# Patient Record
Sex: FEMALE | Race: WHITE | NOT HISPANIC OR LATINO | URBAN - METROPOLITAN AREA
[De-identification: names, ages, dates, MRNs, and addresses within clinical notes are randomized per-mention and may not be internally consistent; named-entity substitution may affect disease eponyms.]

---

## 2022-01-10 ENCOUNTER — EMERGENCY (EMERGENCY)
Facility: HOSPITAL | Age: 87
LOS: 1 days | Discharge: HOME | End: 2022-01-10
Attending: EMERGENCY MEDICINE | Admitting: EMERGENCY MEDICINE
Payer: MEDICARE

## 2022-01-10 VITALS
SYSTOLIC BLOOD PRESSURE: 160 MMHG | DIASTOLIC BLOOD PRESSURE: 79 MMHG | OXYGEN SATURATION: 97 % | RESPIRATION RATE: 18 BRPM | HEART RATE: 74 BPM | TEMPERATURE: 98 F

## 2022-01-10 DIAGNOSIS — M25.512 PAIN IN LEFT SHOULDER: ICD-10-CM

## 2022-01-10 DIAGNOSIS — S62.327A DISPLACED FRACTURE OF SHAFT OF FIFTH METACARPAL BONE, LEFT HAND, INITIAL ENCOUNTER FOR CLOSED FRACTURE: ICD-10-CM

## 2022-01-10 DIAGNOSIS — S00.81XA ABRASION OF OTHER PART OF HEAD, INITIAL ENCOUNTER: ICD-10-CM

## 2022-01-10 DIAGNOSIS — Z23 ENCOUNTER FOR IMMUNIZATION: ICD-10-CM
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
## 2022-01-10 PROCEDURE — 72170 X-RAY EXAM OF PELVIS: CPT | Mod: 26

## 2022-01-10 PROCEDURE — 71045 X-RAY EXAM CHEST 1 VIEW: CPT | Mod: 26

## 2022-01-10 PROCEDURE — 72125 CT NECK SPINE W/O DYE: CPT | Mod: 26,MA

## 2022-01-10 PROCEDURE — 73130 X-RAY EXAM OF HAND: CPT | Mod: 26,LT

## 2022-01-10 PROCEDURE — 73030 X-RAY EXAM OF SHOULDER: CPT | Mod: 26

## 2022-01-10 PROCEDURE — 99284 EMERGENCY DEPT VISIT MOD MDM: CPT

## 2022-01-10 PROCEDURE — 70450 CT HEAD/BRAIN W/O DYE: CPT | Mod: 26,MA

## 2022-01-10 RX ORDER — TETANUS TOXOID, REDUCED DIPHTHERIA TOXOID AND ACELLULAR PERTUSSIS VACCINE, ADSORBED 5; 2.5; 8; 8; 2.5 [IU]/.5ML; [IU]/.5ML; UG/.5ML; UG/.5ML; UG/.5ML
0.5 SUSPENSION INTRAMUSCULAR ONCE
Refills: 0 | Status: COMPLETED | OUTPATIENT
Start: 2022-01-10 | End: 2022-01-10

## 2022-01-10 RX ADMIN — TETANUS TOXOID, REDUCED DIPHTHERIA TOXOID AND ACELLULAR PERTUSSIS VACCINE, ADSORBED 0.5 MILLILITER(S): 5; 2.5; 8; 8; 2.5 SUSPENSION INTRAMUSCULAR at 20:28

## 2022-01-10 NOTE — ED ADULT NURSE NOTE - RESPIRATORY ASSESSMENT
- - - Complex Repair And M Plasty Text: The defect edges were debeveled with a #15 scalpel blade.  The primary defect was closed partially with a complex linear closure.  Given the location of the remaining defect, shape of the defect and the proximity to free margins an M plasty was deemed most appropriate for complete closure of the defect.  Using a sterile surgical marker, an appropriate advancement flap was drawn incorporating the defect and placing the expected incisions within the relaxed skin tension lines where possible.    The area thus outlined was incised deep to adipose tissue with a #15 scalpel blade.  The skin margins were undermined to an appropriate distance in all directions utilizing iris scissors.

## 2022-01-10 NOTE — ED PROVIDER NOTE - CLINICAL SUMMARY MEDICAL DECISION MAKING FREE TEXT BOX
Patient in ED w concern for mechanical fall causing her abrasion to left forehead, and ecchymosis to left hand.  Patient also noting left shoulder pain, though noted to be more chronic and she attributes to arthritis.  Patient on ASA.  Unsure of last tetanus - updated today in ED.  No focal neuro deficits, no midline cervical spine pain/tenderness/stepoff/deformity, no midline thoracic or lumbar spine pain/tenderness/stepoff/deformity.  CT imaging of head, cervical spine and plain film imaging of chest, pelvis, left shoulder and hand completed in ED.  Abrasion to left forehead noted - area is dressed with abx ointment and occlusive dressing. Patient in ED w concern for mechanical fall causing her abrasion to left forehead, and ecchymosis to left hand.  Patient also noting left shoulder pain, though noted to be more chronic and she attributes to arthritis.  Patient on ASA.  Unsure of last tetanus - updated today in ED.  No focal neuro deficits, no midline cervical spine pain/tenderness/stepoff/deformity, no midline thoracic or lumbar spine pain/tenderness/stepoff/deformity.  CT imaging of head, cervical spine and plain film imaging of chest, pelvis, left shoulder and hand completed in ED.  Abrasion to left forehead noted - area is dressed with abx ointment and occlusive dressing.  X ray imaging reviewed and fracture to 5th metacarpal noted.  Orthopedic team consulted and to come to ED to evaluate patient.  Following shift completion, patient's care is handed over to oncoming night team pending CT results and orthopedic consultation.  Patient is stable at time of transfer of care.

## 2022-01-10 NOTE — ED ADULT NURSE NOTE - NS ED NOTE ABUSE SUSPICION NEGLECT YN
BMI 27  Discussed Healthy diet/regular exercise.  Discussed healthy sleep and stress reduction.  Discussed contraception and pre-pregnancy health.  Pap UTD  Vaccines UTD  Check labs as ordered.  F/U 1 year or sooner if needed.    
No

## 2022-01-10 NOTE — ED ADULT NURSE REASSESSMENT NOTE - NS ED NURSE REASSESS COMMENT FT1
pt endorse by MADDIE France. pt here s/p fall in NH. pt cu pt endorse by MADDIE France. pt here s/p fall in [facility]. pt currently in the bathroom, pt uses a walker to ambulate unable to walk without assistance. pt is A&Ox4, no distress noted.

## 2022-01-10 NOTE — ED PROVIDER NOTE - PHYSICAL EXAMINATION
VITAL SIGNS: I have reviewed nursing notes and confirm.  CONSTITUTIONAL: Non toxic; in no acute distress.   SKIN:  + Abrasion to left forehead; hemostasis achieved.    HEAD:  Normocephalic, atraumatic.  EYES: PERRL.  EOM intact; conjunctiva and sclera clear.  ENT: No nasal discharge; airway clear.   NECK: Supple; no midline cervical spine pain/tenderness/stepoff/deformity.   CARD: S1, S2 normal; no murmurs, gallops, or rubs. Regular rate and rhythm.   RESP:  Clear to auscultation b/l, no wheezes, rales or rhonchi.  ABD: Normal bowel sounds; soft; non-distended; non-tender; no guarding/rebound.  MSK:  No tenderness on palaption to chest wall.  No chest wall ecchymosis or paradoxical motion.  No midline thoracic or lumbar spine pain/tenderness/stepoff/deformity.  Pelvis stable to compression.  EXT: + Ecchymosis to left hand over 4th/5th MCPs, no tenderness to palpation over area, no deformity.  Normal ROM x 4 extremities. No clubbing, cyanosis or edema. 2+ pulses to b/l ue/le.  NEURO: Alert, oriented, grossly unremarkable.  5/5 strength x 4 extremities against gravity and external force.  No drift x 4 extremities.  Sensation intact and symmetric x 4 extremities.  No facial asymmetry.    PSYCH: Cooperative, mood and affect appropriate.

## 2022-01-10 NOTE — ED PROVIDER NOTE - PROGRESS NOTE DETAILS
russell: ortho reccs okay to dc home after postreduction xrays. pt confirmed to live at NH where they will be able to assist her. pt otherwise weight-bearing ambulating w/o pain/difficulty w/ assistance.

## 2022-01-10 NOTE — ED PROVIDER NOTE - OBJECTIVE STATEMENT
86 year old female presents to ED secondary to concern for mechanical fall today when she lost her balance.  Patient states she was trying to show her friend how to balance on one leg when she fell over.  She presents to ED with superficial abrasion to left forehead and bruising to left hand.  She notes ongoing left shoulder discomfort, though does not feel this is different from her baseline arthritis pain.  She takes daily ASA and is unsure of her last tetanus update.  Patient denies associated fever, chills, headache, visual changes, chest pain, shortness of breath, abdominal pain, nausea, emesis, changes to bowel movements, peripheral edema, calf pain/tenderness, recent travel, known sick contacts or any additional acute complaints or concerns at this time.

## 2022-01-10 NOTE — ED PROVIDER NOTE - NSFOLLOWUPINSTRUCTIONS_ED_ALL_ED_FT
PLEASE FOLLOW-UP WITH ORTHOPAEDICS, DR JONAS ENNIS, IN 1 WEEK.    ANY XRAY IMAGING RESULTS GIVEN IN THE EMERGENCY DEPARTMENT ARE PRELIMINARY UNTIL FORMALLY READ BY A RADIOLOGIST. YOU WILL BE CONTACTED IF THERE ARE ANY SIGNIFICANT CHANGES IN THE FINAL READ.    PLEASE RETURN TO THE EMERGENCY DEPARTMENT IF FEVER, CHEST PAIN, SHORTNESS OF BREATH, ABDOMINAL PAIN, VOMITING, OTHER CONCERNING SYMPTOMS.    PLEASE CONTACT TUAN FARR ([facility] EMERGENCY DEPARTMENT CLINICAL REFERRAL COORDINATOR) TO ASSIST IN SCHEDULING YOUR FOLLOW-UP APPOINTMENT.    Monday - Friday 11am-7pm  [phone]  [email or web address]

## 2022-01-10 NOTE — ED PROVIDER NOTE - HIV OFFER
Opt out
Lovenox ppx    #FEN  -S/p 1.25 L bolus. NS at 100 cc/hr.   -Replete lytes prn.  -Regular diet.    #CODE  -FULL CODE

## 2022-01-10 NOTE — ED PROVIDER NOTE - CARE PROVIDER_API CALL
Narinder Martini)  Orthopaedic Surgery  [address] [place] [address]  Phone: [phone]  Fax: [phone]  Follow Up Time: 7-10 Days

## 2022-01-10 NOTE — CONSULT NOTE ADULT - SUBJECTIVE AND OBJECTIVE BOX
Orthopaedic Surgery Consult Note    For Surgeon: Dr. Martini    HPI:  Juan presents after fall at nursing home today.  Patient states she was trying to show her friend how to balance on one leg when she fell over. In ED, was found to have a L 5th metacarpal shaft fracture. Denies any numbness or tingling. Denies injury elsewhere.    Vital Signs Last 24 Hrs  T(C): 36.7 (10 Han 2022 20:15), Max: 36.7 (10 Han 2022 20:15)  T(F): 98.1 (10 Han 2022 20:15), Max: 98.1 (10 Han 2022 20:15)  HR: 74 (10 Han 2022 20:15) (74 - 74)  BP: 160/79 (10 Han 2022 20:15) (160/79 - 160/79)  BP(mean): --  RR: 18 (10 Han 2022 20:15) (18 - 18)  SpO2: 97% (10 Han 2022 20:15) (97% - 97%)    Physical Exam:  General: NAD  LUE:  Skin intact; swelling about hand  SILT throughout limb  Firing all intrinsic hand musculature, full motor testing limited due to pain  Radial pulse palpable    Imaging:  X-ray hand (AP, lateral, oblique) -  Displaced 5th metacarpal shaft fracture with shortening and angulation    A/P: oBbbyyFemale presents with L 5th metacarpal shaft fracture after mechanical fall. Metacarpal block and Jahss reduction maneuver performed. Ulnar gutter splint applied.  -NWB LUE in splint  -Obtain post-splint x-rays  -Pain control per ED  -F/u in office in one week  -Discussed with Dr. Martini    Ortho Pager [phone]

## 2022-01-10 NOTE — ED ADULT TRIAGE NOTE - CHIEF COMPLAINT QUOTE
pt had a fall at her nursing home , was standing with her walking frame and tried to stand on one leg and lost her balance, hit left forehead on the floor , no LOC , no blood thinners

## 2022-01-10 NOTE — ED PROVIDER NOTE - CARE PLAN
Principal Discharge DX:	Accidental fall  Secondary Diagnosis:	Abrasion  Secondary Diagnosis:	Traumatic ecchymosis of left hand, initial encounter   1 Principal Discharge DX:	Accidental fall  Secondary Diagnosis:	Abrasion  Secondary Diagnosis:	Left hand fracture

## 2022-01-10 NOTE — ED PROVIDER NOTE - PATIENT PORTAL LINK FT
You can access the FollowMyHealth Patient Portal offered by [facility] by registering at the following website: [email or web address]. By joining [employer]’s FollowMyHealth portal, you will also be able to view your health information using other applications (apps) compatible with our system.

## 2022-01-11 VITALS
DIASTOLIC BLOOD PRESSURE: 87 MMHG | OXYGEN SATURATION: 97 % | TEMPERATURE: 98 F | HEART RATE: 82 BPM | SYSTOLIC BLOOD PRESSURE: 112 MMHG | RESPIRATION RATE: 17 BRPM

## 2022-01-11 PROCEDURE — 70450 CT HEAD/BRAIN W/O DYE: CPT | Mod: MA

## 2022-01-11 PROCEDURE — 72125 CT NECK SPINE W/O DYE: CPT | Mod: MA

## 2022-01-11 PROCEDURE — 73130 X-RAY EXAM OF HAND: CPT | Mod: 26,LT

## 2022-01-11 PROCEDURE — 90471 IMMUNIZATION ADMIN: CPT

## 2022-01-11 PROCEDURE — 73030 X-RAY EXAM OF SHOULDER: CPT

## 2022-01-11 PROCEDURE — 99285 EMERGENCY DEPT VISIT HI MDM: CPT | Mod: 25

## 2022-01-11 PROCEDURE — 26605 TREAT METACARPAL FRACTURE: CPT | Mod: F4

## 2022-01-11 PROCEDURE — 73130 X-RAY EXAM OF HAND: CPT

## 2022-01-11 PROCEDURE — 72170 X-RAY EXAM OF PELVIS: CPT

## 2022-01-11 PROCEDURE — 90715 TDAP VACCINE 7 YRS/> IM: CPT

## 2022-01-11 PROCEDURE — 71045 X-RAY EXAM CHEST 1 VIEW: CPT

## 2025-03-07 ENCOUNTER — EMERGENCY (EMERGENCY)
Facility: HOSPITAL | Age: 89
LOS: 1 days | Discharge: HOME | End: 2025-03-07
Attending: STUDENT IN AN ORGANIZED HEALTH CARE EDUCATION/TRAINING PROGRAM | Admitting: STUDENT IN AN ORGANIZED HEALTH CARE EDUCATION/TRAINING PROGRAM
Payer: MEDICARE

## 2025-03-07 VITALS
DIASTOLIC BLOOD PRESSURE: 49 MMHG | TEMPERATURE: 99 F | HEART RATE: 55 BPM | OXYGEN SATURATION: 96 % | RESPIRATION RATE: 18 BRPM | SYSTOLIC BLOOD PRESSURE: 91 MMHG

## 2025-03-07 VITALS
OXYGEN SATURATION: 98 % | HEIGHT: 64 IN | RESPIRATION RATE: 16 BRPM | TEMPERATURE: 98 F | WEIGHT: 110.01 LBS | HEART RATE: 57 BPM | SYSTOLIC BLOOD PRESSURE: 124 MMHG | DIASTOLIC BLOOD PRESSURE: 54 MMHG

## 2025-03-07 PROCEDURE — 99284 EMERGENCY DEPT VISIT MOD MDM: CPT | Mod: FS,25

## 2025-03-07 PROCEDURE — 70450 CT HEAD/BRAIN W/O DYE: CPT | Mod: MC

## 2025-03-07 PROCEDURE — 12013 RPR F/E/E/N/L/M 2.6-5.0 CM: CPT

## 2025-03-07 PROCEDURE — 99284 EMERGENCY DEPT VISIT MOD MDM: CPT | Mod: 25

## 2025-03-07 PROCEDURE — 72125 CT NECK SPINE W/O DYE: CPT | Mod: 26

## 2025-03-07 PROCEDURE — 72125 CT NECK SPINE W/O DYE: CPT | Mod: MC

## 2025-03-07 PROCEDURE — 70450 CT HEAD/BRAIN W/O DYE: CPT | Mod: 26

## 2025-03-07 RX ORDER — ACETAMINOPHEN 500 MG/5ML
975 LIQUID (ML) ORAL ONCE
Refills: 0 | Status: COMPLETED | OUTPATIENT
Start: 2025-03-07 | End: 2025-03-07

## 2025-03-07 RX ADMIN — Medication 975 MILLIGRAM(S): at 08:53

## 2025-03-07 NOTE — ED PROVIDER NOTE - CLINICAL SUMMARY MEDICAL DECISION MAKING FREE TEXT BOX
89 y/o f hx HTN, HLD, dementia (baseline A&Ox1) presents s/p fall at [facility] with lac to left eyebrow.  No neuro deficits on exam, tetanus up to date, CT head and CT cervical spine neg, lac repaired.  Will d/c, continue wound care, suture removal in 1 week.

## 2025-03-07 NOTE — ED ADULT NURSE NOTE - CHIEF COMPLAINT QUOTE
Pt presents to ED S/P unwitnessed fall out of bed at [facility]. Pt A&Ox1 at baseline with hx dementia. Awake and alert on arrival. Pt has wound to forehead, wrapped in gauze by EMS PTA. MOLST at bedside. EMS denies blood thinners. EKG in progress.

## 2025-03-07 NOTE — ED ADULT TRIAGE NOTE - CHIEF COMPLAINT QUOTE
Pt presents to ED S/P unwitnessed fall out of bed at [facility]. Pt A&Ox1 at baseline with hx dementia. Awake and alert on arrival. Pt has wound to forehead, wrapped in gauze by EMS PTA. MOLST at bedside. EMS denies blood thinners. EKG in progress.

## 2025-03-07 NOTE — ED PROVIDER NOTE - NEUROLOGICAL, MLM
Alert and oriented to person/place, no focal deficits, no motor or sensory deficits.  CN II-XII intact, strength 5/5 b/l upper and lower ext, sensation intact distally b/l upper and lower ext

## 2025-03-07 NOTE — ED PROVIDER NOTE - NSFOLLOWUPINSTRUCTIONS_ED_ALL_ED_FT
Sutures may be removed in 1 week.    CT head and CT cervical spine with no acute findings, tetanus was given in 2022 per medical record, laceration was repaired with sutures today.  Please keep wound clean.

## 2025-03-07 NOTE — ED ADULT NURSE NOTE - NSFALLHARMRISKINTERV_ED_ALL_ED
Assistance OOB with selected safe patient handling equipment if applicable/Communicate risk of Fall with Harm to all staff, patient, and family/Monitor gait and stability/Provide patient with walking aids/Provide visual cue: red socks, yellow wristband, yellow gown, etc/Reinforce activity limits and safety measures with patient and family/Bed in lowest position, wheels locked, appropriate side rails in place/Call bell, personal items and telephone in reach/Instruct patient to call for assistance before getting out of bed/chair/stretcher/Non-slip footwear applied when patient is off stretcher/[place] to call system/Physically safe environment - no spills, clutter or unnecessary equipment/Purposeful Proactive Rounding/Room/bathroom lighting operational, light cord in reach

## 2025-03-07 NOTE — ED PROVIDER NOTE - PATIENT PORTAL LINK FT
You can access the FollowMyHealth Patient Portal offered by [facility] by registering at the following website: [email or web address]. By joining [facility]’s FollowMyHealth portal, you will also be able to view your health information using other applications (apps) compatible with our system.

## 2025-03-07 NOTE — ED ADULT NURSE NOTE - OBJECTIVE STATEMENT
89 y.o female A&Ox2 (disoriented to time) PMHx dementia BIBEMS s/p unwitnessed fall out of bed this morning at [facility]. Pt unable to recall events leading to fall. On arrival, pt has wound to L forehead, wrapped in gauze by EMS. Per EMS, pt A&Ox1 at baseline with hx dementia. Per EMS, pt not on blood thinners. On exam, laceration noted to R forehead. Pt endorses pain at wound site. Pt denies chest pain, SOB, other complaints at this time. EKG done and signed.

## 2025-03-07 NOTE — ED PROVIDER NOTE - OBJECTIVE STATEMENT
89 y/o f hx HTN, HLD, dementia (baseline A&Ox1) presents BIBA from [facility] rehab for eval after pt found sitting on floor next to her bed with lac to left eyebrow, presumed to have fallen.  Pt unsure what happened, reports no pain currently.  Denies vomiting, visual changes, all other ROS negative.

## 2025-03-10 DIAGNOSIS — I10 ESSENTIAL (PRIMARY) HYPERTENSION: ICD-10-CM

## 2025-03-10 DIAGNOSIS — S01.112A LACERATION WITHOUT FOREIGN BODY OF LEFT EYELID AND PERIOCULAR AREA, INITIAL ENCOUNTER: ICD-10-CM

## 2025-03-10 DIAGNOSIS — E78.5 HYPERLIPIDEMIA, UNSPECIFIED: ICD-10-CM
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
